# Patient Record
Sex: FEMALE | Race: WHITE
[De-identification: names, ages, dates, MRNs, and addresses within clinical notes are randomized per-mention and may not be internally consistent; named-entity substitution may affect disease eponyms.]

---

## 2020-09-29 ENCOUNTER — HOSPITAL ENCOUNTER (EMERGENCY)
Dept: HOSPITAL 39 - ER | Age: 65
Discharge: ANOTHER HOSPITAL | End: 2020-09-29
Payer: SELF-PAY

## 2020-09-29 VITALS — DIASTOLIC BLOOD PRESSURE: 84 MMHG | OXYGEN SATURATION: 99 % | SYSTOLIC BLOOD PRESSURE: 156 MMHG

## 2020-09-29 VITALS — TEMPERATURE: 98 F

## 2020-09-29 DIAGNOSIS — Z88.2: ICD-10-CM

## 2020-09-29 DIAGNOSIS — Z91.040: ICD-10-CM

## 2020-09-29 DIAGNOSIS — Z88.5: ICD-10-CM

## 2020-09-29 DIAGNOSIS — Z90.49: ICD-10-CM

## 2020-09-29 DIAGNOSIS — G93.89: ICD-10-CM

## 2020-09-29 DIAGNOSIS — Z92.23: ICD-10-CM

## 2020-09-29 DIAGNOSIS — E27.49: ICD-10-CM

## 2020-09-29 DIAGNOSIS — N83.9: ICD-10-CM

## 2020-09-29 DIAGNOSIS — Z88.0: ICD-10-CM

## 2020-09-29 DIAGNOSIS — Z88.1: ICD-10-CM

## 2020-09-29 DIAGNOSIS — K92.0: ICD-10-CM

## 2020-09-29 DIAGNOSIS — I31.3: ICD-10-CM

## 2020-09-29 DIAGNOSIS — I61.8: Primary | ICD-10-CM

## 2020-09-29 DIAGNOSIS — I10: ICD-10-CM

## 2020-09-29 DIAGNOSIS — Z79.899: ICD-10-CM

## 2020-09-29 DIAGNOSIS — D64.89: ICD-10-CM

## 2020-09-29 DIAGNOSIS — Z85.72: ICD-10-CM

## 2020-09-29 PROCEDURE — 84484 ASSAY OF TROPONIN QUANT: CPT

## 2020-09-29 PROCEDURE — 86901 BLOOD TYPING SEROLOGIC RH(D): CPT

## 2020-09-29 PROCEDURE — 93005 ELECTROCARDIOGRAM TRACING: CPT

## 2020-09-29 PROCEDURE — 86900 BLOOD TYPING SEROLOGIC ABO: CPT

## 2020-09-29 PROCEDURE — 87086 URINE CULTURE/COLONY COUNT: CPT

## 2020-09-29 PROCEDURE — 85025 COMPLETE CBC W/AUTO DIFF WBC: CPT

## 2020-09-29 PROCEDURE — 83690 ASSAY OF LIPASE: CPT

## 2020-09-29 PROCEDURE — 36415 COLL VENOUS BLD VENIPUNCTURE: CPT

## 2020-09-29 PROCEDURE — 85730 THROMBOPLASTIN TIME PARTIAL: CPT

## 2020-09-29 PROCEDURE — 86850 RBC ANTIBODY SCREEN: CPT

## 2020-09-29 PROCEDURE — 84443 ASSAY THYROID STIM HORMONE: CPT

## 2020-09-29 PROCEDURE — 80053 COMPREHEN METABOLIC PANEL: CPT

## 2020-09-29 PROCEDURE — 81001 URINALYSIS AUTO W/SCOPE: CPT

## 2020-09-29 PROCEDURE — 85379 FIBRIN DEGRADATION QUANT: CPT

## 2020-09-29 PROCEDURE — 83605 ASSAY OF LACTIC ACID: CPT

## 2020-09-29 PROCEDURE — 74176 CT ABD & PELVIS W/O CONTRAST: CPT

## 2020-09-29 PROCEDURE — 70450 CT HEAD/BRAIN W/O DYE: CPT

## 2020-09-29 PROCEDURE — 85384 FIBRINOGEN ACTIVITY: CPT

## 2020-09-29 PROCEDURE — 87040 BLOOD CULTURE FOR BACTERIA: CPT

## 2020-09-29 PROCEDURE — 85610 PROTHROMBIN TIME: CPT

## 2020-09-29 NOTE — CT
EXAM DESCRIPTION: 



Head



CLINICAL HISTORY: 



confusion



COMPARISON: 



None Available



TECHNIQUE: 



Contiguous axial CT images of the head were obtained.



Coronal and sagittal reconstructions were created from the axial

data.



This exam was performed according to our departmental

dose-optimization program, which includes automated exposure

control, adjustment of the mA and/or kV according to patient size

and/or use of iterative reconstruction technique.



FINDINGS:  There are multiple scattered bilateral cortical

hyperattenuating lesions measure up to 5 mm each diameter

concerning for small cortical hemorrhages or dystrophic

calcifications. These do not exert significant mass effect on

surrounding structures and there are no definite associated

masses on noncontrast CT.



There is encephalomalacia involving the right frontal lobe.

Poorly defined foci of decreased attenuation do not exert

significant mass effect on surrounding structures and are likely

sequela of prior insult, most likely on the basis of small vessel

disease.



There is no other evidence of acute mass, mass effect, midline

shift or hemorrhage. The ventricles and extra-axial CSF spaces

are unremarkable. The brain parenchyma appears otherwise normal

for the patient's age. No acute abnormalities of the bones is

seen.



IMPRESSION: Scattered bilateral cortical foci of hemorrhage

versus evolving dystrophic calcifications are present, and there

are regions of decreased attenuation and encephalomalacia

suggesting prior insult likely on the basis of small vessel

disease. However underlying masses or inflammatory/infectious

etiology is also possible and follow-up is recommended.

Short-term follow-up is also recommended to exclude expanding

hemorrhages.



 



MR would provide greater parenchymal detail if desired.



Electronically signed by:  Mark Halsted  9/29/2020 6:53 PM CDT

Workstation: [id number]

## 2020-09-29 NOTE — ED.PDOC
History of Present Illness





- General


Time Seen by Provider: 09/29/20 17:37


Source: patient, RN notes reviewed, Vital Signs reviewed, family


Exam Limitations: no limitations





- History of Present Illness


Initial Comments: 





63 yo F comes in with sharp right flank pain that started at 3 AM.  States she 

was eating a cookie when the pain started.  Radiates to front, no dysuria or 

heamturia. no hx of stones.  associated with nausea, but no emesis today. no 

diarrhea, no constipation. denies black or bloody bm. Daughter states since the 

onset of pain, patient has been acting more confused then baseline. no fever, no

cough, no chest pain.  Denies any recent trauma.  no sick contacts. no focal 

weakness


Timing/Duration: other - 15 hours


Severity: moderate


Improving Factors: nothing


Allergies/Adverse Reactions: 


Allergies





Azithromycin [From Z-Gadiel] Allergy (Verified 09/29/20 18:02)


   


Cephalexin Allergy (Verified 09/29/20 18:02)


   


Codeine Allergy (Verified 09/29/20 18:02)


   


Dexamethasone [From Decadron] Allergy (Verified 09/29/20 18:02)


   


Erythromycin Allergy (Verified 09/29/20 18:02)


   


Hydrocodone Allergy (Verified 09/29/20 18:02)


   


Latex Allergy (Verified 09/29/20 18:02)


   


Levofloxacin [From Levaquin] Allergy (Verified 09/29/20 18:02)


   


Penicillins Allergy (Verified 09/29/20 18:02)


   


Sulfamethoxazole w/Trimethoprim [From Bactrim] Allergy (Verified 09/29/20 18:02)


   





Home Medications: 


Ambulatory Orders





Clonidine HCl 0.1 mg PO TID 09/29/20 


Gabapentin 300 mg PO BEDTIME 09/29/20 


Ipratropium-Albuterol [Ipratropium Bromide/Albut 0.5-2.5 (3) mg/3Ml] 1 unit INH 

BID PRN 09/29/20 











Review of Systems





- Review of Systems


Constitutional: Denies: chills, fever


EENTM: Denies: eye pain, double vision, nose pain, throat pain, mouth pain


Respiratory: Denies: cough, short of breath, stridor


Cardiology: Denies: chest pain, edema, palpitations


Gastrointestinal/Abdominal: States: nausea, vomiting.  Denies: abdominal pain, 

diarrhea


Genitourinary: Denies: dysuria, frequency, hematuria


Musculoskeletal: States: back pain.  Denies: joint pain, joint swelling, muscle 

pain


Neurological: Denies: headache, numbness, paresthesia, tingling, tremors, 

weakness


Endocrine: Denies: increased urine, unexplained weight gain, unexplained weight 

loss


Hematologic/Lymphatic: Denies: blood clots, easy bleeding, easy bruising





Past Medical History (General)





- Patient Medical History


Hx Seizures: No


Hx Stroke: No


Hx Dementia: No


Hx Asthma: No


Hx of COPD: No


Hx Cardiac Disorders: No


Hx Congestive Heart Failure: No


Hx Pacemaker: No


Hx Hypertension: Yes


Hx Thyroid Disease: No


Hx Diabetes: No


Hx Gastroesophageal Reflux: No


Hx Renal Disease: No


Hx Cancer: No


Hx of HIV: No


Hx Hepatitis B: No


Hx Hepatitis C: No


Hx MRSA: No


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


Surgical History: appendectomy





Family Medical History





- Family History


  ** Mother


Family History: Unknown





Physical Exam





- Physical Exam


General Appearance: Alert, Comfortable, No apparent distress, Well Developed, 

Well Groomed, Well Hydrated, Well Nourished, Other - steady gait 


Eye Exam: bilateral normal


Ears, Nose, Throat: hearing grossly normal, normal ENT inspection


Neck: non-tender, full range of motion, supple, normal inspection


Respiratory: chest non-tender, lungs clear, normal breath sounds, no respiratory

distress, no accessory muscle use


Cardiovascular/Chest: normal peripheral pulses, regular rate, rhythm, no edema, 

no gallop, no JVD, no murmur


Peripheral Pulses: radial,right: 2+, radial,left: 2+, dorsalis pedis,right: 2+, 

dorsalis pedis,left: 2+


Gastrointestinal/Abdominal: normal bowel sounds, non tender, soft, no 

organomegaly, no pulsatile mass


Rectal Exam: normal exam


Back Exam: normal inspection, no CVA tenderness, no vertebral tenderness


Extremity: normal range of motion, non-tender, normal inspection, no pedal 

edema, no calf tenderness, normal capillary refill


Neurologic: CNs II-XII nml as tested, no motor/sensory deficits, alert, normal 

mood/affect, oriented x 3


Skin Exam: normal color, warm/dry





Progress





- Progress


Progress: 





09/29/20 18:02


Partial ddx: kidney/ureter stone, uti, gallstones, muscle strain, shingles, sbo


09/29/20 18:59





CT head: Scattered bilateral cortical foci of hemorrhage versus evolving 

dystrophic calcifications are present, and there are regions of decreased 

attenuation and encephalomalacia suggesting prior insult likely on the basis of 

small vessel disease. However underlying masses or inflammatory/infectious 

etiology is also possible and follow-up is recommended. Short-term follow-up is 

also recommended to exclude expanding hemorrhages.





CT abd/pelvis: 1. There is heterogeneous increased density and enlargement of 

the right adrenal gland with surrounding edema most concerning for acute to 

subacute hemorrhage. Differential diagnostic considerations include sepsis, DIC,

steroid usage and underlying adrenal tumor. 2. 3.2 cm aneurysmal dilatation of 

the distal abdominal aorta. Recommend CT follow-up in 3 years. 3. 1.2 cm dense 

right ovarian nodule. Pelvic ultrasound recommended. 4. Lingular atelectasis or 

pneumonia. 5. Cardiomegaly and small pericardial effusion noted.





09/29/20 20:20


after discussing findings with daughter and patient, patient notes that she has 

been vomiting blood lately, and she has had a change in her voice.  Attempted 

guiac, but no stool in rectum.  She also notes she has a hx of radiation to her 

face, from facial lymphoma.  





partial ddx of adrenal hemorrhage include: sepsis, DIC, adrenal tumor, 

metastatic disease. Lactic acid wnl, patient afebrile and does not appear 

septic. DDX of dystrophic calcifications include hemorrhage, prior radiation, 

tumor. will get fibrinogen and coags. 





The data reviewed when caring for this patient included: nurse notes, prior 

records, etc. The history and assessments from nurses notes were reviewed and 

considered, and the patient's home medication list was also reviewed and 

considered. My assessment and the results of testing completed here in the ED 

were discussed with the patient/family. All questions were answered, and they 

express understanding of my assessment and the plan.Yolanda Lara DO #[id number]








- EKG/XRAY/CT


EKG: Sinus


Comments: HR 81, NSR, normal intervals, no acute ischemia, no prior for 

comparison.





Departure





- Departure


Clinical Impression: 


 Adrenal hemorrhage, Ovarian mass, right, Pericardial effusion, Encephalomalacia

on imaging study





Anemia


Qualifiers:


 Anemia type: other cause Other causes of anemia: other cause, not classified 

Qualified Code(s): D64.89 - Other specified anemias





ICH (intracerebral hemorrhage)


Qualifiers:


 Intracerebral hemorrhage etiology: nontraumatic Cerebral hemorrhage location: 

other cerebral location Laterality: unspecified laterality Qualified Code(s): 

I61.8 - Other nontraumatic intracerebral hemorrhage





Time of Disposition: 20:28


Disposition: Transfer to Hospital


Condition: Good


Referrals: 


Karuna Lepe FNP [Primary Care Provider] - 1-2 Weeks


Home Medications: 


Ambulatory Orders





Clonidine HCl 0.1 mg PO TID 09/29/20 


Gabapentin 300 mg PO BEDTIME 09/29/20 


Ipratropium-Albuterol [Ipratropium Bromide/Albut 0.5-2.5 (3) mg/3Ml] 1 unit INH 

BID PRN 09/29/20 











Transfer to Outside Facility





- Transfer Information


Decision to Transfer Date: 09/29/20


Decision to Transfer Time: 18:45


Reason for Transfer: specialized care not available

## 2020-09-29 NOTE — CT
EXAM:  CT Abdomen and Pelvis Without Intravenous Contrast



CLINICAL HISTORY:  right flank pain



TECHNIQUE:  Axial computed tomography images of the abdomen and

pelvis without intravenous contrast.  Sagittal and coronal

reformatted images were created and reviewed.  This CT exam was

performed using one or more of the following dose reduction

techniques:  automated exposure control, adjustment of the mA

and/or kV according to patient size, and/or use of iterative

reconstruction technique.



COMPARISON:  No relevant prior studies available.



FINDINGS:

  Limitations:  None.

  Lung bases:  Triangular lingular consolidation noted.

  Pleural space:  No abnormality noted.

  Heart:  Cardiomegaly and small pericardial effusion noted.

  Mediastinum:  No abnormality noted.



 ABDOMEN:

  Liver:  Lack of intravenous contrast limits detection of some

masses.  No abnormality noted.

  Gallbladder and bile ducts:  The gallbladder is partially

collapsed and contains at least one small stone.  Inflammation

not excluded.

  Pancreas:  Lack of intravenous contrast limits detection of

some masses.  No pancreatic mass, calcification, inflammation or

ductal dilation noted.

  Spleen:  No abnormality noted.

  Adrenals:  The left adrenal gland appears normal.

      There is enlargement and edema of the right adrenal gland

which is heterogeneously dense and measures 3.9 x 2.4 x 4.2 cm.

  Kidneys and ureters:  There is a fluid density cortical cyst in

the midpole left kidney measuring 1.7 x 1.3 cm.  There is mild

stranding cranial to the right kidney thought to be related to

the adrenal gland.  Right kidney otherwise appears normal.

  Stomach and bowel:  No distension or mucosal thickening.  No

inflammation noted.



 PELVIS:

  Appendix:  No findings to suggest acute appendicitis.

  Bladder:  Appears normal for the degree of filling.  No stones

or inflammation.  No large mass.  Masses may not be detected in

the absence of opacification.

  Reproductive:  There is a 1.2 cm diameter dense nodule in the

right ovary and small adjacent calcifications.



 ABDOMEN and PELVIS:

  Intraperitoneal space:  No free air.  No significant fluid

collection.

  Bones/joints:  No acute change noted.

  Soft tissues:  No abnormality noted.

  Vasculature:  The distal abdominal aorta is aneurysmal to 3.2

cm.  Intact atherosclerotic plaque noted.

  Lymph nodes:  No pathologically enlarged lymph nodes.



IMPRESSION:     

1.  There is heterogeneous increased density and enlargement of

the right adrenal gland with surrounding edema most concerning

for acute to subacute hemorrhage.  Differential diagnostic

considerations include sepsis, DIC, steroid usage and underlying

adrenal tumor.

2.  3.2 cm aneurysmal dilatation of the distal abdominal aorta. 

Recommend CT follow-up in 3 years.

3.  1.2 cm dense right ovarian nodule.  Pelvic ultrasound

recommended.

4. Lingular atelectasis or pneumonia.  

5. Cardiomegaly and small pericardial effusion noted.



Electronically signed by:  Rhina Beverly MD  9/29/2020 6:54 PM

CDT Workstation: [id number]